# Patient Record
Sex: FEMALE | Race: WHITE | NOT HISPANIC OR LATINO | Employment: STUDENT | URBAN - METROPOLITAN AREA
[De-identification: names, ages, dates, MRNs, and addresses within clinical notes are randomized per-mention and may not be internally consistent; named-entity substitution may affect disease eponyms.]

---
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
## 2024-11-12 ENCOUNTER — HOSPITAL ENCOUNTER (EMERGENCY)
Facility: HOSPITAL | Age: 19
Discharge: HOME | End: 2024-11-13
Attending: EMERGENCY MEDICINE | Admitting: EMERGENCY MEDICINE
Payer: COMMERCIAL

## 2024-11-12 ENCOUNTER — APPOINTMENT (OUTPATIENT)
Dept: GENERAL RADIOLOGY | Facility: HOSPITAL | Age: 19
End: 2024-11-12
Payer: COMMERCIAL

## 2024-11-12 DIAGNOSIS — J93.9 PNEUMOTHORAX ON LEFT: Primary | ICD-10-CM

## 2024-11-12 LAB
BASOPHILS # BLD AUTO: 0.12 10*3/MM3 (ref 0–0.2)
BASOPHILS NFR BLD AUTO: 1.3 % (ref 0–1.5)
DEPRECATED RDW RBC AUTO: 36.7 FL (ref 37–54)
EOSINOPHIL # BLD AUTO: 0.47 10*3/MM3 (ref 0–0.4)
EOSINOPHIL NFR BLD AUTO: 5 % (ref 0.3–6.2)
ERYTHROCYTE [DISTWIDTH] IN BLOOD BY AUTOMATED COUNT: 12.1 % (ref 12.3–15.4)
HCT VFR BLD AUTO: 46.2 % (ref 34–46.6)
HGB BLD-MCNC: 16.4 G/DL (ref 12–15.9)
HOLD SPECIMEN: NORMAL
HOLD SPECIMEN: NORMAL
IMM GRANULOCYTES # BLD AUTO: 0.01 10*3/MM3 (ref 0–0.05)
IMM GRANULOCYTES NFR BLD AUTO: 0.1 % (ref 0–0.5)
LYMPHOCYTES # BLD AUTO: 3.28 10*3/MM3 (ref 0.7–3.1)
LYMPHOCYTES NFR BLD AUTO: 34.6 % (ref 19.6–45.3)
MCH RBC QN AUTO: 30.2 PG (ref 26.6–33)
MCHC RBC AUTO-ENTMCNC: 35.5 G/DL (ref 31.5–35.7)
MCV RBC AUTO: 85.1 FL (ref 79–97)
MONOCYTES # BLD AUTO: 0.81 10*3/MM3 (ref 0.1–0.9)
MONOCYTES NFR BLD AUTO: 8.5 % (ref 5–12)
NEUTROPHILS NFR BLD AUTO: 4.79 10*3/MM3 (ref 1.7–7)
NEUTROPHILS NFR BLD AUTO: 50.5 % (ref 42.7–76)
NRBC BLD AUTO-RTO: 0 /100 WBC (ref 0–0.2)
PLATELET # BLD AUTO: 295 10*3/MM3 (ref 140–450)
PMV BLD AUTO: 9.6 FL (ref 6–12)
RBC # BLD AUTO: 5.43 10*6/MM3 (ref 3.77–5.28)
WBC NRBC COR # BLD AUTO: 9.48 10*3/MM3 (ref 3.4–10.8)
WHOLE BLOOD HOLD COAG: NORMAL
WHOLE BLOOD HOLD SPECIMEN: NORMAL

## 2024-11-12 PROCEDURE — 71045 X-RAY EXAM CHEST 1 VIEW: CPT

## 2024-11-12 PROCEDURE — 93010 ELECTROCARDIOGRAM REPORT: CPT | Performed by: SPECIALIST

## 2024-11-12 PROCEDURE — 93005 ELECTROCARDIOGRAM TRACING: CPT | Performed by: EMERGENCY MEDICINE

## 2024-11-12 PROCEDURE — 99284 EMERGENCY DEPT VISIT MOD MDM: CPT

## 2024-11-12 PROCEDURE — 80053 COMPREHEN METABOLIC PANEL: CPT | Performed by: EMERGENCY MEDICINE

## 2024-11-12 PROCEDURE — 83735 ASSAY OF MAGNESIUM: CPT | Performed by: EMERGENCY MEDICINE

## 2024-11-12 PROCEDURE — 83690 ASSAY OF LIPASE: CPT | Performed by: EMERGENCY MEDICINE

## 2024-11-12 PROCEDURE — 84484 ASSAY OF TROPONIN QUANT: CPT | Performed by: EMERGENCY MEDICINE

## 2024-11-12 PROCEDURE — 83880 ASSAY OF NATRIURETIC PEPTIDE: CPT | Performed by: EMERGENCY MEDICINE

## 2024-11-12 PROCEDURE — 85025 COMPLETE CBC W/AUTO DIFF WBC: CPT | Performed by: EMERGENCY MEDICINE

## 2024-11-12 RX ORDER — BUSPIRONE HYDROCHLORIDE 10 MG/1
1 TABLET ORAL EVERY 12 HOURS SCHEDULED
COMMUNITY
Start: 2024-09-28

## 2024-11-12 RX ORDER — SODIUM CHLORIDE 0.9 % (FLUSH) 0.9 %
10 SYRINGE (ML) INJECTION AS NEEDED
Status: DISCONTINUED | OUTPATIENT
Start: 2024-11-12 | End: 2024-11-13 | Stop reason: HOSPADM

## 2024-11-12 RX ORDER — ASPIRIN 81 MG/1
324 TABLET, CHEWABLE ORAL ONCE
Status: DISCONTINUED | OUTPATIENT
Start: 2024-11-12 | End: 2024-11-13 | Stop reason: HOSPADM

## 2024-11-13 VITALS
OXYGEN SATURATION: 99 % | BODY MASS INDEX: 18.6 KG/M2 | WEIGHT: 137.35 LBS | DIASTOLIC BLOOD PRESSURE: 73 MMHG | HEART RATE: 91 BPM | SYSTOLIC BLOOD PRESSURE: 108 MMHG | RESPIRATION RATE: 10 BRPM | HEIGHT: 72 IN | TEMPERATURE: 98.2 F

## 2024-11-13 LAB
ALBUMIN SERPL-MCNC: 4.6 G/DL (ref 3.5–5.2)
ALBUMIN/GLOB SERPL: 1.6 G/DL
ALP SERPL-CCNC: 87 U/L (ref 39–117)
ALT SERPL W P-5'-P-CCNC: 29 U/L (ref 1–33)
ANION GAP SERPL CALCULATED.3IONS-SCNC: 9.7 MMOL/L (ref 5–15)
AST SERPL-CCNC: 20 U/L (ref 1–32)
BILIRUB SERPL-MCNC: 0.7 MG/DL (ref 0–1.2)
BUN SERPL-MCNC: 19 MG/DL (ref 6–20)
BUN/CREAT SERPL: 19.6 (ref 7–25)
CALCIUM SPEC-SCNC: 9.9 MG/DL (ref 8.6–10.5)
CHLORIDE SERPL-SCNC: 103 MMOL/L (ref 98–107)
CO2 SERPL-SCNC: 27.3 MMOL/L (ref 22–29)
CREAT SERPL-MCNC: 0.97 MG/DL (ref 0.57–1)
EGFRCR SERPLBLD CKD-EPI 2021: 86.5 ML/MIN/1.73
GLOBULIN UR ELPH-MCNC: 2.9 GM/DL
GLUCOSE SERPL-MCNC: 99 MG/DL (ref 65–99)
LIPASE SERPL-CCNC: 36 U/L (ref 13–60)
MAGNESIUM SERPL-MCNC: 2 MG/DL (ref 1.7–2.2)
NT-PROBNP SERPL-MCNC: <36 PG/ML (ref 0–450)
POTASSIUM SERPL-SCNC: 4 MMOL/L (ref 3.5–5.2)
PROT SERPL-MCNC: 7.5 G/DL (ref 6–8.5)
QT INTERVAL: 334 MS
QTC INTERVAL: 405 MS
SODIUM SERPL-SCNC: 140 MMOL/L (ref 136–145)
TROPONIN T SERPL HS-MCNC: <6 NG/L

## 2024-11-13 RX ORDER — NAPROXEN 500 MG/1
500 TABLET ORAL 2 TIMES DAILY PRN
Qty: 20 TABLET | Refills: 0 | Status: SHIPPED | OUTPATIENT
Start: 2024-11-13

## 2024-11-13 NOTE — DISCHARGE INSTRUCTIONS
Plenty fluids.  Take medication as directed.  Follow-up with Dr. Morales tomorrow in the office for repeat chest x-ray

## 2024-11-13 NOTE — ED PROVIDER NOTES
Time: 11:32 PM EST  Date of encounter:  11/12/2024  Independent Historian/Clinical History and Information was obtained by:   Patient    History is limited by: N/A    Chief Complaint: Chest pain      History of Present Illness:  Patient is a 18 y.o. year old male who presents to the emergency department for evaluation of chest pain.  This patient presents to the emergency department complaining of left-sided chest pain which is sharp and worse with movement and inspiration.  There is a history of previous pneumothorax and states that this feels like it was the pneumothorax.  There is no fever chills cough vomiting diarrhea and no trauma.      Patient Care Team  Primary Care Provider: Francis Walker APRN    Past Medical History:     No Known Allergies  Past Medical History:   Diagnosis Date    Acid reflux disease     ADHD (attention deficit hyperactivity disorder)     Allergic     Autism     Broken bones      History reviewed. No pertinent surgical history.  Family History   Problem Relation Age of Onset    Diabetes Mother        Home Medications:  Prior to Admission medications    Medication Sig Start Date End Date Taking? Authorizing Provider   busPIRone (BUSPAR) 10 MG tablet Take 1 tablet by mouth Every 12 (Twelve) Hours. 9/28/24  Yes Provider, Historical, MD   Concerta 18 MG CR tablet Take 1 tablet by mouth Every Morning 3/17/22   Emergency, Nurse Epic, RN   famotidine (Pepcid) 20 MG tablet Take 1 tablet by mouth 2 (Two) Times a Day As Needed for Heartburn. 5/4/23   Francis Walker APRN        Social History:   Social History     Tobacco Use    Smoking status: Never    Smokeless tobacco: Never   Vaping Use    Vaping status: Never Used   Substance Use Topics    Alcohol use: Never    Drug use: Never         Review of Systems:  Review of Systems   Constitutional:  Negative for chills and fever.   HENT:  Negative for congestion, ear pain and sore throat.    Eyes:  Negative for pain.   Respiratory:  Negative for  "cough, chest tightness and shortness of breath.    Cardiovascular:  Positive for chest pain.   Gastrointestinal:  Negative for abdominal pain, diarrhea, nausea and vomiting.   Genitourinary:  Negative for flank pain and hematuria.   Musculoskeletal:  Negative for joint swelling.   Skin:  Negative for pallor.   Neurological:  Negative for seizures and headaches.   All other systems reviewed and are negative.       Physical Exam:  /73 (Patient Position: Lying)   Pulse 91   Temp 98.2 °F (36.8 °C) (Oral)   Resp 10   Ht 182.9 cm (72\")   Wt 62.3 kg (137 lb 5.6 oz)   SpO2 99%   BMI 18.63 kg/m²     Physical Exam  Vitals and nursing note reviewed.   Constitutional:       General: She is not in acute distress.     Appearance: Normal appearance. She is not toxic-appearing.   HENT:      Head: Normocephalic and atraumatic.      Mouth/Throat:      Mouth: Mucous membranes are moist.   Eyes:      General: No scleral icterus.  Cardiovascular:      Rate and Rhythm: Normal rate and regular rhythm.      Pulses: Normal pulses.      Heart sounds: Normal heart sounds.   Pulmonary:      Effort: Pulmonary effort is normal. No respiratory distress.      Breath sounds: Normal breath sounds.   Abdominal:      General: Abdomen is flat.      Palpations: Abdomen is soft.      Tenderness: There is no abdominal tenderness.   Musculoskeletal:         General: Normal range of motion.      Cervical back: Normal range of motion and neck supple.   Skin:     General: Skin is warm and dry.      Capillary Refill: Capillary refill takes less than 2 seconds.   Neurological:      General: No focal deficit present.      Mental Status: She is alert and oriented to person, place, and time. Mental status is at baseline.                  Procedures:  Procedures      Medical Decision Making:      Comorbidities that affect care:    Prior pneumothorax    External Notes reviewed:    Previous Clinic Note: At [facility] for pectus " excavatum and a male      The following orders were placed and all results were independently analyzed by me:  Orders Placed This Encounter   Procedures    XR Chest 1 View    [place]ley Draw    High Sensitivity Troponin T    Comprehensive Metabolic Panel    Lipase    BNP    Magnesium    CBC Auto Differential    NPO Diet NPO Type: Strict NPO    Undress & Gown    Continuous Pulse Oximetry    Pulmonology (on-call MD unless specified)    Oxygen Therapy- Nasal Cannula; Titrate 1-6 LPM Per SpO2; 90 - 95%    ECG 12 Lead ED Triage Standing Order; Chest Pain    ECG 12 Lead ED Triage Standing Order; Chest Pain    Insert Peripheral IV    CBC & Differential    Green Top (Gel)    Lavender Top    Gold Top - SST    Light Blue Top       Medications Given in the Emergency Department:  Medications   sodium chloride 0.9 % flush 10 mL (has no administration in time range)   aspirin chewable tablet 324 mg (324 mg Oral Not Given 11/12/24 2324)        ED Course:       EKG: Sinus rhythm rate 89 bpm  No acute ischemic changes are noted.    Labs:    Lab Results (last 24 hours)       Procedure Component Value Units Date/Time    High Sensitivity Troponin T [[id number]]  (Normal) Collected: 11/12/24 2332    Specimen: Blood Updated: 11/13/24 0003     HS Troponin T <6 ng/L     Narrative:      High Sensitive Troponin T Reference Range:  <14.0 ng/L- Negative Female for AMI  <22.0 ng/L- Negative Male for AMI  >=14 - Abnormal Female indicating possible myocardial injury.  >=22 - Abnormal Male indicating possible myocardial injury.   Clinicians would have to utilize clinical acumen, EKG, Troponin, and serial changes to determine if it is an Acute Myocardial Infarction or myocardial injury due to an underlying chronic condition.         CBC & Differential [[id number]]  (Abnormal) Collected: 11/12/24 2332    Specimen: Blood Updated: 11/12/24 2342    Narrative:      The following orders were created for panel order CBC & Differential.  Procedure                                Abnormality         Status                     ---------                               -----------         ------                     CBC Auto Differential[[id number]]        Abnormal            Final result                 Please view results for these tests on the individual orders.    Comprehensive Metabolic Panel [[id number]] Collected: 11/12/24 2332    Specimen: Blood Updated: 11/13/24 0003     Glucose 99 mg/dL      BUN 19 mg/dL      Creatinine 0.97 mg/dL      Sodium 140 mmol/L      Potassium 4.0 mmol/L      Chloride 103 mmol/L      CO2 27.3 mmol/L      Calcium 9.9 mg/dL      Total Protein 7.5 g/dL      Albumin 4.6 g/dL      ALT (SGPT) 29 U/L      AST (SGOT) 20 U/L      Alkaline Phosphatase 87 U/L      Total Bilirubin 0.7 mg/dL      Globulin 2.9 gm/dL      A/G Ratio 1.6 g/dL      BUN/Creatinine Ratio 19.6     Anion Gap 9.7 mmol/L      eGFR 86.5 mL/min/1.73     Narrative:      GFR Normal >60  Chronic Kidney Disease <60  Kidney Failure <15      Lipase [[id number]]  (Normal) Collected: 11/12/24 2332    Specimen: Blood Updated: 11/13/24 0003     Lipase 36 U/L     BNP [[id number]]  (Normal) Collected: 11/12/24 2332    Specimen: Blood Updated: 11/13/24 0000     proBNP <36.0 pg/mL     Narrative:      This assay is used as an aid in the diagnosis of individuals suspected of having heart failure. It can be used as an aid in the diagnosis of acute decompensated heart failure (ADHF) in patients presenting with signs and symptoms of ADHF to the emergency department (ED). In addition, NT-proBNP of <300 pg/mL indicates ADHF is not likely.    Age Range Result Interpretation  NT-proBNP Concentration (pg/mL:      <50             Positive            >450                   Gray                 300-450                    Negative             <300    50-75           Positive            >900                  Gray                300-900                  Negative            <300      >75             Positive             >1800                  Mehta                300-1800                  Negative            <300    Magnesium [[id number]]  (Normal) Collected: 11/12/24 2332    Specimen: Blood Updated: 11/13/24 0003     Magnesium 2.0 mg/dL     CBC Auto Differential [[id number]]  (Abnormal) Collected: 11/12/24 2332    Specimen: Blood Updated: 11/12/24 2342     WBC 9.48 10*3/mm3      RBC 5.43 10*6/mm3      Hemoglobin 16.4 g/dL      Hematocrit 46.2 %      MCV 85.1 fL      MCH 30.2 pg      MCHC 35.5 g/dL      RDW 12.1 %      RDW-SD 36.7 fl      MPV 9.6 fL      Platelets 295 10*3/mm3      Neutrophil % 50.5 %      Lymphocyte % 34.6 %      Monocyte % 8.5 %      Eosinophil % 5.0 %      Basophil % 1.3 %      Immature Grans % 0.1 %      Neutrophils, Absolute 4.79 10*3/mm3      Lymphocytes, Absolute 3.28 10*3/mm3      Monocytes, Absolute 0.81 10*3/mm3      Eosinophils, Absolute 0.47 10*3/mm3      Basophils, Absolute 0.12 10*3/mm3      Immature Grans, Absolute 0.01 10*3/mm3      nRBC 0.0 /100 WBC              Imaging:    XR Chest 1 View    Result Date: 11/12/2024  AP PORTABLE CHEST  HISTORY: Chest pain.  COMPARISON: 6/11/2023  TECHNIQUE: AP portable chest x-ray.  FINDINGS: Cardiac and mediastinal contours remain normal. Pulmonary vascularity is normal. The lungs are clear. There is a small left apical pneumothorax probably measuring less than 5%. No midline shift.      Small left apical pneumothorax, otherwise negative.  NOTIFICATION: Critical Value/emergent results were called by telephone at the time of interpretation on 11/12/2024 11:46 PM to Jan Rose MD who verbally acknowledged these results.    Electronically Signed By-Dr. Jose Enrique Howard MD On:11/12/2024 11:46 PM         Differential Diagnosis and Discussion:    Chest Pain:  Based on the patient's signs and symptoms, I considered aortic dissection, myocardial infaction, pulmonary embolism, cardiac tamponade, pericarditis, pneumothorax, musculoskeletal chest pain and other differential  diagnosis as an etiology of the patient's chest pain.     All labs were reviewed and interpreted by me.  All X-rays impressions were independently interpreted by me.  EKG was interpreted by me.    MDM                     Patient Care Considerations:    CT CHEST: I considered ordering a CT scan of the chest, however there is no sign of pulmonary embolism and the patient has a pulse oximeter 100% on room air.      Consultants/Shared Management Plan:    Consultant: I have discussed the case with Dr. Morales who states patient can be discharged and follow-up in the office tomorrow for repeat chest x-ray    Social Determinants of Health:    Patient has presented with family members who are responsible, reliable and will ensure follow up care.      Disposition and Care Coordination:    Discharged: I considered escalation of care by admitting this patient to the hospital, however the patient has a small pneumothorax and he is clinically stable with a pulse oximeter of 100%.    I have explained discharge medications and the need for follow up with the patient/caretakers. This was also printed in the discharge instructions. Patient was discharged with the following medications and follow up:      Medication List        New Prescriptions      naproxen 500 MG tablet  Commonly known as: NAPROSYN  Take 1 tablet by mouth 2 (Two) Times a Day As Needed (Pain).               Where to Get Your Medications        These medications [place] [facility] [place] [address] - [phone]  - [phone] FX  [address], [place] [address]      Phone: [phone]   naproxen 500 MG tablet      Jimi Morales, DO  [address]  [address]  [place] [address] [phone]    In 1 day  Call for appointment       Final diagnoses:   Pneumothorax on left        ED Disposition       ED Disposition   Discharge    Condition   Stable    Comment   --               This  medical record created using voice recognition software.             Jan Rose, DO  11/13/24 0122

## 2024-11-14 ENCOUNTER — OFFICE VISIT (OUTPATIENT)
Dept: PULMONOLOGY | Facility: CLINIC | Age: 19
End: 2024-11-14
Payer: COMMERCIAL

## 2024-11-14 VITALS
SYSTOLIC BLOOD PRESSURE: 96 MMHG | HEIGHT: 72 IN | TEMPERATURE: 98.7 F | RESPIRATION RATE: 14 BRPM | OXYGEN SATURATION: 96 % | WEIGHT: 141.4 LBS | BODY MASS INDEX: 19.15 KG/M2 | DIASTOLIC BLOOD PRESSURE: 68 MMHG | HEART RATE: 69 BPM

## 2024-11-14 DIAGNOSIS — J93.83 SPONTANEOUS PNEUMOTHORAX: Primary | ICD-10-CM

## 2024-11-14 DIAGNOSIS — Q79.60 EHLERS-DANLOS SYNDROME: ICD-10-CM

## 2024-11-14 PROCEDURE — 99214 OFFICE O/P EST MOD 30 MIN: CPT | Performed by: INTERNAL MEDICINE

## 2024-11-14 NOTE — PROGRESS NOTES
Pulmonary Consultation    No ref. provider found,    Thank you for asking me to see Ignacio Avila for   Chief Complaint   Patient presents with    Pneumothorax    Follow-up     ER   .      History of Present Illness  Ignacio Avila is a 18 y.o. female with a PMH significant for Kash-Danlos syndrome and right pneumothorax a year earlier presents for evaluation patient was referred from emergency room where he presented with some chest discomfort and pain chest x-ray showed small apical left pneumothorax patient presently denies any shortness of breath cough wheeze or expectoration he does not smoke or vape      Tobacco use history:  Never smoker      Review of Systems: History obtained from chart review and the patient.  Review of Systems   All other systems reviewed and are negative.    As described in the HPI. Otherwise, remainder of ROS (14 systems) were negative.    Patient Active Problem List   Diagnosis    Kash-Danlos syndrome    Marfanoid habitus    Ascending aorta dilatation    Spontaneous pneumothorax         Current Outpatient Medications:     Concerta 18 MG CR tablet, Take 1 tablet by mouth Every Morning, Disp: , Rfl:     busPIRone (BUSPAR) 10 MG tablet, Take 1 tablet by mouth Every 12 (Twelve) Hours. (Patient not taking: Reported on 11/14/2024), Disp: , Rfl:     famotidine (Pepcid) 20 MG tablet, Take 1 tablet by mouth 2 (Two) Times a Day As Needed for Heartburn. (Patient not taking: Reported on 11/14/2024), Disp: 180 tablet, Rfl: 1    naproxen (NAPROSYN) 500 MG tablet, Take 1 tablet by mouth 2 (Two) Times a Day As Needed (Pain). (Patient not taking: Reported on 11/14/2024), Disp: 20 tablet, Rfl: 0    No Known Allergies    Past Medical History:   Diagnosis Date    Acid reflux disease     ADHD (attention deficit hyperactivity disorder)     Allergic     Autism     Broken bones      History reviewed. No pertinent surgical history.  Social History     Socioeconomic History    Marital status: Single  "  Tobacco Use    Smoking status: Never    Smokeless tobacco: Never   Vaping Use    Vaping status: Never Used   Substance and Sexual Activity    Alcohol use: Never    Drug use: Never    Sexual activity: Defer     Family History   Problem Relation Age of Onset    Diabetes Mother        XR Chest 1 View    Result Date: 11/12/2024  Small left apical pneumothorax, otherwise negative.  NOTIFICATION: Critical Value/emergent results were called by telephone at the time of interpretation on 11/12/2024 11:46 PM to Jan Rose MD who verbally acknowledged these results.    Electronically Signed By-Dr. Jose Enrique Howard MD On:11/12/2024 11:46 PM           Objective     Blood pressure 96/68, pulse 69, temperature 98.7 °F (37.1 °C), temperature source Temporal, resp. rate 14, height 182.9 cm (72\"), weight 64.1 kg (141 lb 6.4 oz), SpO2 96%.  Physical Exam  Vitals and nursing note reviewed.   Constitutional:       Appearance: Normal appearance.   HENT:      Head: Normocephalic and atraumatic.      Nose: Nose normal.      Mouth/Throat:      Mouth: Mucous membranes are moist.      Pharynx: Oropharynx is clear.   Eyes:      Extraocular Movements: Extraocular movements intact.      Conjunctiva/sclera: Conjunctivae normal.      Pupils: Pupils are equal, round, and reactive to light.   Cardiovascular:      Rate and Rhythm: Normal rate and regular rhythm.      Pulses: Normal pulses.      Heart sounds: Normal heart sounds.   Pulmonary:      Effort: Pulmonary effort is normal.      Breath sounds: Normal breath sounds.   Musculoskeletal:         General: Normal range of motion.      Cervical back: Normal range of motion and neck supple.   Skin:     General: Skin is warm.      Capillary Refill: Capillary refill takes 2 to 3 seconds.   Neurological:      Mental Status: She is alert and oriented to person, place, and time.   Psychiatric:         Mood and Affect: Mood normal.         Behavior: Behavior normal.       Immunization History "   Administered Date(s) Administered    COVID-19 (PFIZER) Purple Cap Monovalent 05/25/2021, 06/15/2021    DTaP 02/22/2005, 04/25/2005, 06/24/2005, 08/11/2006, 11/01/2010    Hep A, 2 Dose 02/28/2019    Hepatitis A 06/07/2018, 06/12/2019    Hepatitis B Adult/Adolescent IM 01/27/2005, 02/24/2005, 09/22/2005    HiB 02/22/2005, 04/25/2005, 06/24/2005, 04/18/2006    IPV 02/22/2005, 04/25/2005, 08/11/2006, 11/01/2010    MMR 04/18/2006, 04/26/2011    Meningococcal Conjugate 05/31/2016, 02/22/2023    Tdap 05/31/2016    Varicella 01/10/2006, 04/26/2011            Assessment & Plan     Diagnoses and all orders for this visit:    1. Spontaneous pneumothorax (Primary)  -     Complete PFT - Pre & Post Bronchodilator; Future  -     XR Chest 2 View; Future    2. Kash-Danlos syndrome  -     Complete PFT - Pre & Post Bronchodilator; Future  -     XR Chest 2 View; Future         Result Review :       Data reviewed : Radiologic studies chest      Discussion/ Recommendations:   X-rays reviewed shows small apical left pneumothorax  Presently he is comfortable and his examination is normal  Will repeat chest x-ray next week  Will order PFTs  Follow-up  Advised to avoid smoking or vaping  Discussed vaccination and recommended    Pediatric BMI = 17 %ile (Z= -0.94) based on CDC (Girls, 2-20 Years) BMI-for-age based on BMI available on 11/14/2024.. BMI is within normal parameters. No other follow-up for BMI required.           Return in about 4 months (around 3/14/2025).      Thank you for allowing me to participate in the care of Ignacio Avila. Please do not hesitate to contact me with any questions.         This document has been electronically signed by Chapito Camargo MD on November 14, 2024 08:41 EST

## 2025-01-28 ENCOUNTER — HOSPITAL ENCOUNTER (OUTPATIENT)
Dept: RESPIRATORY THERAPY | Facility: HOSPITAL | Age: 20
Discharge: HOME | End: 2025-01-28
Admitting: INTERNAL MEDICINE
Payer: COMMERCIAL

## 2025-01-28 DIAGNOSIS — Q79.60 EHLERS-DANLOS SYNDROME: ICD-10-CM

## 2025-01-28 DIAGNOSIS — J93.83 SPONTANEOUS PNEUMOTHORAX: ICD-10-CM

## 2025-01-28 PROCEDURE — 94729 DIFFUSING CAPACITY: CPT

## 2025-01-28 PROCEDURE — 94726 PLETHYSMOGRAPHY LUNG VOLUMES: CPT

## 2025-01-28 PROCEDURE — 94060 EVALUATION OF WHEEZING: CPT

## 2025-01-28 RX ORDER — ALBUTEROL SULFATE 0.83 MG/ML
2.5 SOLUTION RESPIRATORY (INHALATION) ONCE
Status: COMPLETED | OUTPATIENT
Start: 2025-01-28 | End: 2025-01-28

## 2025-01-28 RX ADMIN — ALBUTEROL SULFATE 2.5 MG: 2.5 SOLUTION RESPIRATORY (INHALATION) at 14:45

## 2025-03-25 ENCOUNTER — OFFICE VISIT (OUTPATIENT)
Dept: PULMONOLOGY | Facility: CLINIC | Age: 20
End: 2025-03-25
Payer: COMMERCIAL

## 2025-03-25 VITALS
DIASTOLIC BLOOD PRESSURE: 79 MMHG | BODY MASS INDEX: 19.23 KG/M2 | RESPIRATION RATE: 16 BRPM | TEMPERATURE: 99.3 F | WEIGHT: 142 LBS | OXYGEN SATURATION: 96 % | SYSTOLIC BLOOD PRESSURE: 111 MMHG | HEART RATE: 88 BPM | HEIGHT: 72 IN

## 2025-03-25 DIAGNOSIS — J93.83 SPONTANEOUS PNEUMOTHORAX: Primary | ICD-10-CM

## 2025-03-25 DIAGNOSIS — Q79.60 EHLERS-DANLOS SYNDROME: ICD-10-CM

## 2025-03-25 PROCEDURE — 99213 OFFICE O/P EST LOW 20 MIN: CPT | Performed by: INTERNAL MEDICINE

## 2025-03-25 RX ORDER — DEXMETHYLPHENIDATE HYDROCHLORIDE 10 MG/1
CAPSULE, EXTENDED RELEASE ORAL
COMMUNITY
Start: 2025-03-20

## 2025-03-25 NOTE — PROGRESS NOTES
Pulmonary Office Follow-up    Subjective     Ignacio Avila is seen today at the office for   Chief Complaint   Patient presents with    Follow-up     4 month    Spontaneous pneumothorax         HPI  Ignacio Avila is a 19 y.o. female with a PMH significant for Kash Danlos syndrome and spontaneous pneumothorax presents for follow-up patient has a history of pneumothorax he presently denies any shortness of breath cough wheeze or chest pain      Tobacco use history:  Never smoker      Patient Active Problem List   Diagnosis    Kash-Danlos syndrome    Marfanoid habitus    Ascending aorta dilatation    Spontaneous pneumothorax       Review of Systems  Review of Systems   All other systems reviewed and are negative.    As described in the HPI. Otherwise, remainder of ROS (14 systems) were negative.    Medications, Allergies, Social, and Family Histories reviewed as per EMR.    Result Review :            Objective     Vitals:    03/25/25 1439   BP: 111/79   Pulse: 88   Resp: 16   Temp: 99.3 °F (37.4 °C)   SpO2: 96%         03/25/25  1439   Weight: 64.4 kg (142 lb)       Physical Exam  Vitals and nursing note reviewed.   HENT:      Head: Normocephalic and atraumatic.      Nose: Nose normal.      Mouth/Throat:      Pharynx: Oropharynx is clear.   Eyes:      Extraocular Movements: Extraocular movements intact.      Conjunctiva/sclera: Conjunctivae normal.      Pupils: Pupils are equal, round, and reactive to light.   Cardiovascular:      Rate and Rhythm: Normal rate and regular rhythm.      Pulses: Normal pulses.      Heart sounds: Normal heart sounds.   Pulmonary:      Effort: Pulmonary effort is normal.      Breath sounds: Normal breath sounds.   Musculoskeletal:      Cervical back: Normal range of motion and neck supple.   Skin:     General: Skin is warm.      Capillary Refill: Capillary refill takes 2 to 3 seconds.   Neurological:      Mental Status: She is alert.         No radiology results for the last 90 days.      Assessment & Plan     Diagnoses and all orders for this visit:    1. Spontaneous pneumothorax (Primary)  -     XR Chest 2 View; Future    2. Kash-Danlos syndrome         Discussion/ Recommendations:   Will order chest x-ray  PFTs reviewed no airways obstruction noted  Vaccinations discussed and recommended    BMI is within normal parameters. No other follow-up for BMI required.        Return in about 1 year (around 3/25/2026).          This document has been electronically signed by Chapito Camargo MD on March 25, 2025 14:45 EDT